# Patient Record
Sex: MALE | Race: BLACK OR AFRICAN AMERICAN | NOT HISPANIC OR LATINO | URBAN - METROPOLITAN AREA
[De-identification: names, ages, dates, MRNs, and addresses within clinical notes are randomized per-mention and may not be internally consistent; named-entity substitution may affect disease eponyms.]

---

## 2022-12-01 ENCOUNTER — EMERGENCY (EMERGENCY)
Facility: HOSPITAL | Age: 52
LOS: 1 days | Discharge: HOME | End: 2022-12-01
Admitting: EMERGENCY MEDICINE

## 2022-12-01 ENCOUNTER — HOSPITAL ENCOUNTER (INPATIENT)
Dept: HOSPITAL 74 - YASAS | Age: 52
LOS: 4 days | Discharge: HOME | End: 2022-12-05
Attending: SURGERY | Admitting: ALLERGY & IMMUNOLOGY
Payer: COMMERCIAL

## 2022-12-01 VITALS
TEMPERATURE: 97 F | RESPIRATION RATE: 18 BRPM | DIASTOLIC BLOOD PRESSURE: 76 MMHG | OXYGEN SATURATION: 97 % | SYSTOLIC BLOOD PRESSURE: 166 MMHG | HEART RATE: 108 BPM

## 2022-12-01 VITALS — BODY MASS INDEX: 25.8 KG/M2

## 2022-12-01 DIAGNOSIS — F10.24: ICD-10-CM

## 2022-12-01 DIAGNOSIS — F10.230: Primary | ICD-10-CM

## 2022-12-01 DIAGNOSIS — F17.210: ICD-10-CM

## 2022-12-01 DIAGNOSIS — Z86.19: ICD-10-CM

## 2022-12-01 DIAGNOSIS — F41.9: ICD-10-CM

## 2022-12-01 DIAGNOSIS — F10.280: ICD-10-CM

## 2022-12-01 DIAGNOSIS — F32.A: ICD-10-CM

## 2022-12-01 DIAGNOSIS — Z21: ICD-10-CM

## 2022-12-01 PROCEDURE — HZ2ZZZZ DETOXIFICATION SERVICES FOR SUBSTANCE ABUSE TREATMENT: ICD-10-PCS | Performed by: SURGERY

## 2022-12-01 PROCEDURE — 99284 EMERGENCY DEPT VISIT MOD MDM: CPT

## 2022-12-01 PROCEDURE — U0005 INFEC AGEN DETEC AMPLI PROBE: HCPCS

## 2022-12-01 PROCEDURE — U0003 INFECTIOUS AGENT DETECTION BY NUCLEIC ACID (DNA OR RNA); SEVERE ACUTE RESPIRATORY SYNDROME CORONAVIRUS 2 (SARS-COV-2) (CORONAVIRUS DISEASE [COVID-19]), AMPLIFIED PROBE TECHNIQUE, MAKING USE OF HIGH THROUGHPUT TECHNOLOGIES AS DESCRIBED BY CMS-2020-01-R: HCPCS

## 2022-12-01 RX ADMIN — Medication SCH MG: at 22:50

## 2022-12-01 RX ADMIN — Medication SCH MG: at 22:53

## 2022-12-01 NOTE — ED PROVIDER NOTE - PHYSICAL EXAMINATION
Constitutional:  Well appearing, awake, alert, oriented to person, place, time/situation and in no apparent distress  Head:  NC/AT, symmetric  ENMT: Airway patent  Eyes:  Clear bilaterally, pupils equal, round   Cardiac:  Normal rate  Respiratory:  Normal respiratory rate and effort  GI:   Abd soft, non-distended  MSK:  Atraumatic, FROM  Neuro:  Alert and oriented, clear speech, steady gait, 5/5 strength to extremities x4, no tongue vesiculations  Skin:  Skin normal color for race, warm, dry and intact.    Psych:  Normal mood and affect, no apparent risk to self or others.

## 2022-12-01 NOTE — ED PROVIDER NOTE - NS ED ROS FT
Constitutional:  no fever, no chills  Eyes:  no discharge, no irritations, no pain, no redness, visual changes  Ears:  no ear pain, no ear drainage,  no hearing problems  Nose:  no nasal congestion, no nasal drainage  Mouth/Throat:  no hoarseness and no throat pain  Neck:  no stiffness, no pain, no lumps, no swollen glands  Cardiac:  no chest pain, no edema  Respiratory:  no cough, no shortness of breath  GI: no abdominal pain, no bloating, no constipation, no diarrhea, + nausea, no vomiting  :  no dysuria, no urinary frequency, no hematuria, no discharge  MSK:  no back pain, no msk pain, no weakness  NEURO:  no headache, no weakness, no numbness  Skin:  no lesions, no pruritis, no jaundice, no bruising, no rash  Psych:  + anxiety  Endocrine:  no diabetes, no thyroid issues

## 2022-12-01 NOTE — ED ADULT NURSE NOTE - OBJECTIVE STATEMENT
Pt admits to etoh intake, states last drink was at 0200; pt normally drinks 1 pint of bourbon and 4 nips of maricruz daily, over past 24 hours he states he had double that. State [place]. Pt is daily drinker with hx of withdrawal but no seizure hx. Pt answering all questions appropriately. Placed on bed alarm. After this RN exited room pt immediately up OOB to go to bathroom, pt steady on feet.

## 2022-12-01 NOTE — ED ADULT NURSE NOTE - NSIMPLEMENTINTERV_GEN_ALL_ED
Implemented All Fall Risk Interventions:  [place] to call system. Call bell, personal items and telephone within reach. Instruct patient to call for assistance. Room bathroom lighting operational. Non-slip footwear when patient is off stretcher. Physically safe environment: no spills, clutter or unnecessary equipment. Stretcher in lowest position, wheels locked, appropriate side rails in place. Provide visual cue, wrist band, yellow gown, etc. Monitor gait and stability. Monitor for mental status changes and reorient to person, place, and time. Review medications for side effects contributing to fall risk. Reinforce activity limits and safety measures with patient and family.

## 2022-12-01 NOTE — ED ADULT NURSE NOTE - BEFAST SPEECH PHRASE
Decrease Coreg to 12.5mg    You can either take 2 pills of your 6.25mg or cut your 25mg tablets in 1/2.      Follow up in 2 weeks    Labs as directed with [address] [place] [address] Avenue all other medications as directed Yes

## 2022-12-01 NOTE — ED PROVIDER NOTE - OBJECTIVE STATEMENT
52-year-old male presents to the emergency department complaining of alcohol withdrawal.  Patient states that he often drinks alcohol but "runs into trouble when he drinks all day long".  Patient is complaining of feeling anxious and mild nausea.  His last drink was approximately 12 hours ago.  Patient states he would like to be "stabilized".

## 2022-12-01 NOTE — SBIRT NOTE ADULT - NSSBIRTALCACTIVEREFTXDET_GEN_A_CORE
Provided SBIRT services: Full screen positive. Referral to Treatment Performed. Screening results were reviewed with the patient and patient was provided information about healthy guidelines and potential negative consequences associated with level of risk. Motivation and readiness to reduce or stop use was discussed and goals and activities to make changes were suggested/offered.

## 2022-12-01 NOTE — ED PROVIDER NOTE - CLINICAL SUMMARY MEDICAL DECISION MAKING FREE TEXT BOX
52-year-old male presents to ED complaining of alcohol withdrawal.  Vital signs stable, no acute distress.  Patient ambulatory with steady gait and conversant with clear speech.  CIWA 2 at present.  Patient demonstrates violent tremors of hands and conversation that resolves when left alone.  No tongue fasciculations noted.  Pt tolerating PO.  Patient informed he does not meet criteria for alcohol withdrawal medications at this time.  SBIRT counselor consulted.  Patient amenable to detox at out hospital facility.

## 2022-12-01 NOTE — ED PROVIDER NOTE - PATIENT PORTAL LINK FT
You can access the FollowMyHealth Patient Portal offered by [facility] by registering at the following website: [email or web address]. By joining [facility]’s FollowMyHealth portal, you will also be able to view your health information using other applications (apps) compatible with our system.

## 2022-12-02 LAB
ALBUMIN SERPL-MCNC: 3.9 G/DL (ref 3.4–5)
ALP SERPL-CCNC: 82 U/L (ref 45–117)
ALT SERPL-CCNC: 98 U/L (ref 13–61)
ANION GAP SERPL CALC-SCNC: 11 MMOL/L (ref 8–16)
AST SERPL-CCNC: 139 U/L (ref 15–37)
BILIRUB SERPL-MCNC: 1.3 MG/DL (ref 0.2–1)
BUN SERPL-MCNC: 11.8 MG/DL (ref 7–18)
CALCIUM SERPL-MCNC: 9.7 MG/DL (ref 8.5–10.1)
CHLORIDE SERPL-SCNC: 101 MMOL/L (ref 98–107)
CO2 SERPL-SCNC: 28 MMOL/L (ref 21–32)
CREAT SERPL-MCNC: 1.1 MG/DL (ref 0.55–1.3)
DEPRECATED RDW RBC AUTO: 12.7 % (ref 11.9–15.9)
GLUCOSE SERPL-MCNC: 113 MG/DL (ref 74–106)
HCT VFR BLD CALC: 40.6 % (ref 35.4–49)
HGB BLD-MCNC: 14.1 GM/DL (ref 11.7–16.9)
MCH RBC QN AUTO: 33.7 PG (ref 25.7–33.7)
MCHC RBC AUTO-ENTMCNC: 34.7 G/DL (ref 32–35.9)
MCV RBC: 97 FL (ref 80–96)
PLATELET # BLD AUTO: 183 10^3/UL (ref 134–434)
PMV BLD: 7.5 FL (ref 7.5–11.1)
PROT SERPL-MCNC: 7.1 G/DL (ref 6.4–8.2)
RBC # BLD AUTO: 4.18 M/MM3 (ref 4–5.6)
SODIUM SERPL-SCNC: 140 MMOL/L (ref 136–145)
WBC # BLD AUTO: 3.3 K/MM3 (ref 4–10)

## 2022-12-02 RX ADMIN — Medication SCH MG: at 22:30

## 2022-12-02 RX ADMIN — EMTRICITABINE AND TENOFOVIR ALAFENAMIDE SCH EACH: 200; 25 TABLET ORAL at 14:57

## 2022-12-02 RX ADMIN — DOLUTEGRAVIR SODIUM SCH MG: 50 TABLET, FILM COATED ORAL at 14:57

## 2022-12-02 RX ADMIN — Medication SCH TAB: at 10:39

## 2022-12-03 DIAGNOSIS — Z71.51 DRUG ABUSE COUNSELING AND SURVEILLANCE OF DRUG ABUSER: ICD-10-CM

## 2022-12-03 DIAGNOSIS — R41.82 ALTERED MENTAL STATUS, UNSPECIFIED: ICD-10-CM

## 2022-12-03 DIAGNOSIS — F10.139 ALCOHOL ABUSE WITH WITHDRAWAL, UNSPECIFIED: ICD-10-CM

## 2022-12-03 RX ADMIN — DOLUTEGRAVIR SODIUM SCH MG: 50 TABLET, FILM COATED ORAL at 07:10

## 2022-12-03 RX ADMIN — Medication SCH TAB: at 10:39

## 2022-12-03 RX ADMIN — ESCITALOPRAM SCH MG: 20 TABLET, FILM COATED ORAL at 10:39

## 2022-12-03 RX ADMIN — Medication SCH MG: at 22:52

## 2022-12-03 RX ADMIN — EMTRICITABINE AND TENOFOVIR ALAFENAMIDE SCH EACH: 200; 25 TABLET ORAL at 07:10

## 2022-12-04 RX ADMIN — EMTRICITABINE AND TENOFOVIR ALAFENAMIDE SCH EACH: 200; 25 TABLET ORAL at 07:23

## 2022-12-04 RX ADMIN — ESCITALOPRAM SCH MG: 20 TABLET, FILM COATED ORAL at 10:22

## 2022-12-04 RX ADMIN — Medication SCH TAB: at 10:22

## 2022-12-04 RX ADMIN — DOLUTEGRAVIR SODIUM SCH MG: 50 TABLET, FILM COATED ORAL at 07:23

## 2022-12-04 RX ADMIN — Medication SCH MG: at 22:36

## 2022-12-05 VITALS
DIASTOLIC BLOOD PRESSURE: 65 MMHG | SYSTOLIC BLOOD PRESSURE: 106 MMHG | HEART RATE: 77 BPM | TEMPERATURE: 97.7 F | RESPIRATION RATE: 16 BRPM

## 2022-12-05 RX ADMIN — Medication SCH: at 10:44

## 2022-12-05 RX ADMIN — DOLUTEGRAVIR SODIUM SCH: 50 TABLET, FILM COATED ORAL at 10:44

## 2022-12-05 RX ADMIN — ESCITALOPRAM SCH: 20 TABLET, FILM COATED ORAL at 10:44

## 2022-12-05 RX ADMIN — EMTRICITABINE AND TENOFOVIR ALAFENAMIDE SCH: 200; 25 TABLET ORAL at 10:43
